# Patient Record
Sex: FEMALE | Race: WHITE | URBAN - METROPOLITAN AREA
[De-identification: names, ages, dates, MRNs, and addresses within clinical notes are randomized per-mention and may not be internally consistent; named-entity substitution may affect disease eponyms.]

---

## 2017-09-13 ENCOUNTER — OFFICE VISIT (OUTPATIENT)
Dept: DERMATOLOGY | Age: 52
End: 2017-09-13

## 2017-09-13 DIAGNOSIS — L82.1 SEBORRHEIC KERATOSES: Primary | ICD-10-CM

## 2017-09-13 DIAGNOSIS — Z86.018 HISTORY OF DYSPLASTIC NEVUS: ICD-10-CM

## 2017-09-13 DIAGNOSIS — D22.9 BENIGN NEVUS: ICD-10-CM

## 2017-09-13 DIAGNOSIS — L82.0 SEBORRHEIC KERATOSES, INFLAMED: ICD-10-CM

## 2017-09-13 PROCEDURE — 17110 DESTRUCTION B9 LES UP TO 14: CPT | Performed by: DERMATOLOGY

## 2017-09-13 PROCEDURE — 99202 OFFICE O/P NEW SF 15 MIN: CPT | Performed by: DERMATOLOGY

## 2017-09-13 RX ORDER — BUSPIRONE HYDROCHLORIDE 10 MG/1
10 TABLET ORAL
COMMUNITY
Start: 2017-02-23

## 2017-09-13 RX ORDER — LISINOPRIL AND HYDROCHLOROTHIAZIDE 12.5; 1 MG/1; MG/1
1 TABLET ORAL
COMMUNITY
Start: 2017-02-23

## 2017-09-13 RX ORDER — CHOLECALCIFEROL (VITAMIN D3) 1250 MCG
CAPSULE ORAL
COMMUNITY
Start: 2017-09-11

## 2019-11-20 ENCOUNTER — OFFICE VISIT (OUTPATIENT)
Dept: DERMATOLOGY | Age: 54
End: 2019-11-20
Payer: COMMERCIAL

## 2019-11-20 DIAGNOSIS — D22.9 BENIGN NEVUS: ICD-10-CM

## 2019-11-20 DIAGNOSIS — L82.0 SEBORRHEIC KERATOSES, INFLAMED: Primary | ICD-10-CM

## 2019-11-20 DIAGNOSIS — L82.1 SEBORRHEIC KERATOSES: ICD-10-CM

## 2019-11-20 DIAGNOSIS — Z86.018 HISTORY OF DYSPLASTIC NEVUS: ICD-10-CM

## 2019-11-20 PROCEDURE — 17110 DESTRUCTION B9 LES UP TO 14: CPT | Performed by: DERMATOLOGY

## 2019-11-20 PROCEDURE — 99213 OFFICE O/P EST LOW 20 MIN: CPT | Performed by: DERMATOLOGY

## 2021-03-15 ENCOUNTER — OFFICE VISIT (OUTPATIENT)
Dept: DERMATOLOGY | Age: 56
End: 2021-03-15
Payer: COMMERCIAL

## 2021-03-15 VITALS — TEMPERATURE: 98.5 F

## 2021-03-15 DIAGNOSIS — L82.0 SEBORRHEIC KERATOSES, INFLAMED: ICD-10-CM

## 2021-03-15 DIAGNOSIS — Z86.018 HISTORY OF DYSPLASTIC NEVUS: ICD-10-CM

## 2021-03-15 DIAGNOSIS — L82.1 SEBORRHEIC KERATOSES: ICD-10-CM

## 2021-03-15 DIAGNOSIS — D22.9 BENIGN NEVUS: ICD-10-CM

## 2021-03-15 DIAGNOSIS — B07.0 PLANTAR WART: Primary | ICD-10-CM

## 2021-03-15 PROCEDURE — 17110 DESTRUCTION B9 LES UP TO 14: CPT | Performed by: DERMATOLOGY

## 2021-03-15 PROCEDURE — 99213 OFFICE O/P EST LOW 20 MIN: CPT | Performed by: DERMATOLOGY

## 2021-08-27 NOTE — PROGRESS NOTES
[facility]) Dermatology  Char Houston M.D.  [phone]       Leticia Hawthorne  1965    54 y.o. female     Date of Visit: 3/15/2021    Chief Complaint:   Chief Complaint   Patient presents with    Skin Exam    Verruca Vulgaris     Plantar wart - R bottom of foot PCP froze a month ago         I was asked to see this patient by Dr. Campbell ref. provider found. History of Present Illness:  1. Total-body skin exam    New problem-viral verruca right plantar foot. Present for almost a year-increased in size. Primary care provider treated with liquid nitrogen about a month ago. Patient not currently treating at home. Irritated seborrheic keratosis left medial breast-became thick and dry-rubbed on her clothing and bled. Has since healed without difficulty, but in a location that it is easily traumatized    Multiple seborrheic keratoses over her torso-slowly increasing in size and number but no other symptomatic lesions    Multiple nevi. Stable in size, shape, color. Asymptomatic. Does monitor her skin for change. Outside hospital records:  10/11 left lateral shoulder dysplastic nevus with moderate dysplasia, completely excised. 4/11 right anterior hip dysplastic nevus, moderate dysplasia with congenital features excised     No personal or family history of skin cancer     Former tanning bed user     Former patient in [address]    Review of Systems:  Constitutional: Reports general sense of well-being       Past Medical History, Surgical History, Family History, Medications and Allergies reviewed.     Social History:   Social History     Socioeconomic History    Marital status:      Spouse name: Not on file    Number of children: Not on file    Years of education: Not on file    Highest education level: Not on file   Occupational History    Not on file   Social Needs    Financial resource strain: Not on file    Food insecurity     Worry: Not on file     Inability: Not on file   [facility] Called patient he stated he got his labs done this morning.    Transportation needs     Medical: Not on file     Non-medical: Not on file   Tobacco Use    Smoking status: Never Smoker    Smokeless tobacco: Never Used   Substance and Sexual Activity    Alcohol use: Yes     Alcohol/week: 2.0 standard drinks     Types: 2 Glasses of wine per week     Comment: monthly    Drug use: No    Sexual activity: Not on file   Lifestyle    Physical activity     Days per week: Not on file     Minutes per session: Not on file    Stress: Not on file   Relationships    Social connections     Talks on phone: Not on file     Gets together: Not on file     Attends Yazidi service: Not on file     Active member of club or organization: Not on file     Attends meetings of clubs or organizations: Not on file     Relationship status: Not on file    Intimate partner violence     Fear of current or ex partner: Not on file     Emotionally abused: Not on file     Physically abused: Not on file     Forced sexual activity: Not on file   Other Topics Concern    Not on file   Social History Narrative    Not on file       Physical Examination       -General: Well-appearing, NAD  1. Normal affect. Total body skin exam including scalp, face, neck, chest, abdomen, back, bilateral upper extremities, bilateral lower extremities, ocular conjunctiva, external lips, and nails was performed. Examination normal unless stated below. Underwear area not examined. Scattered on the trunk and extremities are multiple well-defined round and oval symmetric smoothly-bordered uniformly brown macules and papules. Right plantar foot verrucous plaque  Multiple widespread hyperkeratotic stuck on papules and plaques over her torso including a left medial breast  Well-healed scar at site of prior dysplastic nevi no sign of recurrence      Assessment and Plan     1. Plantar wart -2-right plantar foot lesion(s) treated w/ liquid nitrogen. Edu re: risk of blister formation, discomfort, scar, hypopigmentation.  Discussed wound care. Discussed 71% salicylic acid under occlusion at night and keeping area [id number] with primary care doctor as already planned for ongoing liquid nitrogen     2. Seborrheic keratoses, inflamed -left-1 lesion(s) treated w/ liquid nitrogen. Edu re: risk of blister formation, discomfort, scar, hypopigmentation. Discussed wound care. 3. Seborrheic keratoses - Discussed underlying nature of seborrheic keratosis and low risk of malignancy. Treatment reserved for lesions that are itching, bleeding, growing or otherwise becoming bothersome. Discussed monitoring for change with reevaluation for changing lesions. 4. Benign nevus - Benign acquired melanocytic nevi  -Recommend monthly self skin exams   -Educated regarding the ABCDEs of melanoma detection   -Call for any new/changing moles or concerning lesions  -Reviewed sun protective behavior -- sun avoidance during the peak hours of the day, sun-protective clothing (including hat and sunglasses), sunscreen use (water resistant, broad spectrum, SPF at least 30, need for reapplication every 2 to 3 hours), avoidance of tanning beds      5.  History of dysplastic nevus-monitor for new or changing pigmented lesion

## 2022-02-16 ENCOUNTER — OFFICE VISIT (OUTPATIENT)
Dept: DERMATOLOGY | Age: 57
End: 2022-02-16
Payer: COMMERCIAL

## 2022-02-16 VITALS — TEMPERATURE: 98 F

## 2022-02-16 DIAGNOSIS — L82.0 SEBORRHEIC KERATOSES, INFLAMED: ICD-10-CM

## 2022-02-16 DIAGNOSIS — L82.1 SEBORRHEIC KERATOSES: ICD-10-CM

## 2022-02-16 DIAGNOSIS — D22.9 BENIGN NEVUS: ICD-10-CM

## 2022-02-16 DIAGNOSIS — D48.5 NEOPLASM OF UNCERTAIN BEHAVIOR OF SKIN: Primary | ICD-10-CM

## 2022-02-16 PROCEDURE — 11102 TANGNTL BX SKIN SINGLE LES: CPT | Performed by: DERMATOLOGY

## 2022-02-16 PROCEDURE — 99213 OFFICE O/P EST LOW 20 MIN: CPT | Performed by: DERMATOLOGY

## 2022-02-16 PROCEDURE — 17111 DESTRUCTION B9 LESIONS 15/>: CPT | Performed by: DERMATOLOGY

## 2022-02-16 RX ORDER — CETIRIZINE HYDROCHLORIDE 10 MG/1
10 TABLET ORAL DAILY
COMMUNITY

## 2022-02-16 RX ORDER — SERTRALINE HYDROCHLORIDE 25 MG/1
25 TABLET, FILM COATED ORAL DAILY
COMMUNITY
Start: 2021-11-30

## 2022-02-16 RX ORDER — VIT C/B6/B5/MAGNESIUM/HERB 173 50-5-6-5MG
CAPSULE ORAL DAILY
COMMUNITY

## 2022-02-16 NOTE — PROGRESS NOTES
[facility]) Dermatology  Josue Bullock M.D.  [phone]       Robyn Montemayor  1965    64 y.o. female     Date of Visit: 2/16/2022    Chief Complaint:   Chief Complaint   Patient presents with    Skin Lesion        I was asked to see this patient by Dr. Campbell ref. provider found. History of Present Illness:  1. Total-body skin exam    New scaly pink papule upper lip at [facility]-has bled but usually after trauma. Patient has scratch at lesion. Present for at least months. Widespread seborrheic keratoses torso, scalp-increasing in size and number. Pruritic. Multiple other relatively flat seborrheic keratoses that are asymptomatic-not itching or bleeding. Multiple nevi-stable in size, shape, color. Has not noticed any new or changing pigmented lesions. Does monitor her skin for change. Outside hospital records:  10/11 left lateral shoulder dysplastic nevus with moderate dysplasia, completely excised. 4/11 right anterior hip dysplastic nevus, moderate dysplasia with congenital features excised     No personal or family history of skin cancer     Former tanning bed user     Former patient in [address] [place] [address]    Review of Systems:  Constitutional: Reports general sense of well-being       Past Medical History, Surgical History, Family History, Medications and Allergies reviewed. Social History:   Social History     Socioeconomic History    Marital status:      Spouse name: Not on file    Number of children: Not on file    Years of education: Not on file    Highest education level: Not on file   Occupational History    Not on file   Tobacco Use    Smoking status: Never Smoker    Smokeless tobacco: Never Used   Vaping Use    Vaping Use: Never used   Substance and Sexual Activity    Alcohol use:  Yes     Alcohol/week: 2.0 standard drinks     Types: 2 Glasses of wine per week     Comment: monthly    Drug use: No    Sexual activity: Not on file   Other Topics Concern    Not on file   Social History Narrative    Not on file     Social Determinants of Health     Financial Resource Strain:     Difficulty of Paying Living Expenses: Not on file   Food Insecurity:     Worried About Running Out of Food in the Last Year: Not on file    Aditya of Food in the Last Year: Not on file   Transportation Needs:     Lack of Transportation (Medical): Not on file    Lack of Transportation (Non-Medical): Not on file   Physical Activity:     Days of Exercise per Week: Not on file    Minutes of Exercise per Session: Not on file   Stress:     Feeling of Stress : Not on file   Social Connections:     Frequency of Communication with Friends and Family: Not on file    Frequency of Social Gatherings with Friends and Family: Not on file    Attends Pentecostalism Services: Not on file    Active Member of [address] [place] [address] [employer] or Organizations: Not on file    Attends Club or Organization Meetings: Not on file    Marital Status: Not on file   Intimate Partner Violence:     Fear of Current or Ex-Partner: Not on file    Emotionally Abused: Not on file    Physically Abused: Not on file    Sexually Abused: Not on file   Housing Stability:     Unable to Pay for Housing in the Last Year: Not on file    Number of [place] in the Last Year: Not on file    Unstable Housing in the Last Year: Not on file       Physical Examination       -General: Well-appearing, NAD  1. Normal affect. Total body skin exam including scalp, face, neck, chest, abdomen, back, bilateral upper extremities, bilateral lower extremities, ocular conjunctiva, external lips, and nails was performed. Examination normal unless stated below. Underwear area not examined. 'Scattered on the trunk and extremities are multiple well-defined round and oval symmetric smoothly-bordered uniformly brown macules and papules.   Multiple hyperkeratotic stuck on papules and plaques torso, scalp  Visibly traumatized seborrheic keratosis left scalp, also over her torso

## 2022-02-18 LAB — DERMATOLOGY PATHOLOGY REPORT: NORMAL

## 2022-04-21 ENCOUNTER — OFFICE VISIT (OUTPATIENT)
Dept: DERMATOLOGY | Age: 57
End: 2022-04-21
Payer: COMMERCIAL

## 2022-04-21 DIAGNOSIS — L82.0 SEBORRHEIC KERATOSES, INFLAMED: Primary | ICD-10-CM

## 2022-04-21 DIAGNOSIS — B07.9 VERRUCA: ICD-10-CM

## 2022-04-21 PROCEDURE — 17111 DESTRUCTION B9 LESIONS 15/>: CPT | Performed by: DERMATOLOGY

## 2022-04-21 NOTE — PROGRESS NOTES
[facility]) Dermatology  Adeel Marrufo M.D.  [phone]       Carol Salvaodr  1965    64 y.o. female     Date of Visit: 4/21/2022    Chief Complaint:   Chief Complaint   Patient presents with    Skin Lesion     LN2        I was asked to see this patient by Dr. Campbell ref. provider found. History of Present Illness:  1. Patient presents today for follow-up of seborrheic keratoses-did have good improvement after treatment with liquid nitrogen on her lower back, still has multiple seborrheic keratoses inframammary chest, lateral breast, scalp that are pruritic, raised, increasing in number. Plantar wart right plantar surface of foot-treated previously with liquid nitrogen by myself, treated again by her primary care doctor about 2 weeks ago. Plans to see a podiatrist.  Has tried over-the-counter freeze off and salicylic acid. Outside hospital records:  10/11 left lateral shoulder dysplastic nevus with moderate dysplasia, completely excised. 4/11 right anterior hip dysplastic nevus, moderate dysplasia with congenital features excise3/d  2/22 mid upper lip at [facility] compound nevus     No personal or family history of skin cancer     Former tanning bed user     Former patient in [address] [place] [address]    Review of Systems:  Constitutional: Reports general sense of well-being       Past Medical History, Surgical History, Family History, Medications and Allergies reviewed. Social History:   Social History     Socioeconomic History    Marital status:      Spouse name: Not on file    Number of children: Not on file    Years of education: Not on file    Highest education level: Not on file   Occupational History    Not on file   Tobacco Use    Smoking status: Never Smoker    Smokeless tobacco: Never Used   Vaping Use    Vaping Use: Never used   Substance and Sexual Activity    Alcohol use:  Yes     Alcohol/week: 2.0 standard drinks     Types: 2 Glasses of wine per week     Comment: monthly    Drug use: No    Sexual activity: Not on file   Other Topics Concern    Not on file   Social History Narrative    Not on file     Social Determinants of Health     Financial Resource Strain:     Difficulty of Paying Living Expenses: Not on file   Food Insecurity:     Worried About Running Out of Food in the Last Year: Not on file    Aditya of Food in the Last Year: Not on file   Transportation Needs:     Lack of Transportation (Medical): Not on file    Lack of Transportation (Non-Medical): Not on file   Physical Activity:     Days of Exercise per Week: Not on file    Minutes of Exercise per Session: Not on file   Stress:     Feeling of Stress : Not on file   Social Connections:     Frequency of Communication with Friends and Family: Not on file    Frequency of Social Gatherings with Friends and Family: Not on file    Attends Orthodoxy Services: Not on file    Active Member of [address] [place] [address] [employer] or Organizations: Not on file    Attends Club or Organization Meetings: Not on file    Marital Status: Not on file   Intimate Partner Violence:     Fear of Current or Ex-Partner: Not on file    Emotionally Abused: Not on file    Physically Abused: Not on file    Sexually Abused: Not on file   Housing Stability:     Unable to Pay for Housing in the Last Year: Not on file    Number of [place] in the Last Year: Not on file    Unstable Housing in the Last Year: Not on file       Physical Examination       -General: Well-appearing, NAD  1. Multiple hyperkeratotic stuck on papules lateral breast, inframammary chest, scalp  Verrucous papule right plantar foot      Assessment and Plan     1. Seborrheic keratoses, inflamed -19-torso, scalp lesion(s) treated w/ liquid nitrogen. Edu re: risk of blister formation, discomfort, scar, hypopigmentation. Discussed wound care. 2. Verruca -1-right plantar foot lesion(s) treated w/ liquid nitrogen x3. Edu re: risk of blister formation, discomfort, scar, hypopigmentation. Discussed wound care.    Salicylic acid 58% under [id number] with podiatry

## 2023-03-13 ENCOUNTER — OFFICE VISIT (OUTPATIENT)
Dept: DERMATOLOGY | Age: 58
End: 2023-03-13
Payer: COMMERCIAL

## 2023-03-13 DIAGNOSIS — L82.1 SEBORRHEIC KERATOSES: ICD-10-CM

## 2023-03-13 DIAGNOSIS — L82.0 SEBORRHEIC KERATOSES, INFLAMED: ICD-10-CM

## 2023-03-13 DIAGNOSIS — Z86.018 HISTORY OF DYSPLASTIC NEVUS: ICD-10-CM

## 2023-03-13 DIAGNOSIS — D48.5 NEOPLASM OF UNCERTAIN BEHAVIOR OF SKIN: Primary | ICD-10-CM

## 2023-03-13 DIAGNOSIS — D22.9 BENIGN NEVUS: ICD-10-CM

## 2023-03-13 PROCEDURE — 17110 DESTRUCTION B9 LES UP TO 14: CPT | Performed by: DERMATOLOGY

## 2023-03-13 PROCEDURE — 11102 TANGNTL BX SKIN SINGLE LES: CPT | Performed by: DERMATOLOGY

## 2023-03-13 PROCEDURE — 99213 OFFICE O/P EST LOW 20 MIN: CPT | Performed by: DERMATOLOGY

## 2023-03-13 NOTE — PROGRESS NOTES
[facility]) Dermatology  Read LISA Pelaez  [phone]       Frankey Pai  1965    62 y.o. female     Date of Visit: 3/13/2023    Chief Complaint:   Chief Complaint   Patient presents with    Skin Lesion        I was asked to see this patient by Dr. Campbell ref. provider found. History of Present Illness:  1. Total-body skin exam    Scattered on the trunk and extremities are multiple well-defined round and oval symmetric smoothly-bordered uniformly brown macules and papules. Multiple irritated seborrheic keratoses-left lower abdomen, lateral torso bilaterally, right upper eyelid-increasing in size, becoming pruritic. Multiple other seborrheic keratoses torso that are asymptomatic. Not itching, bleeding. Outside hospital records:  10/11 left lateral shoulder dysplastic nevus with moderate dysplasia, completely excised. 4/11 right anterior hip dysplastic nevus, moderate dysplasia with congenital features excise3/d  2/22 mid upper lip at [facility] compound nevus     No personal or family history of skin cancer     Former tanning bed user     Former patient in [address]:  Constitutional: Reports general sense of well-being       Past Medical History, Surgical History, Family History, Medications and Allergies reviewed. Social History:   Social History     Socioeconomic History    Marital status:      Spouse name: Not on file    Number of children: Not on file    Years of education: Not on file    Highest education level: Not on file   Occupational History    Not on file   Tobacco Use    Smoking status: Never    Smokeless tobacco: Never   Vaping Use    Vaping Use: Never used   Substance and Sexual Activity    Alcohol use:  Yes     Alcohol/week: 2.0 standard drinks     Types: 2 Glasses of wine per week     Comment: monthly    Drug use: No    Sexual activity: Not on file   Other Topics Concern    Not on file   Social History Narrative    Not on file     Social Determinants of Health     Financial Resource Strain: Not on file   Food Insecurity: Not on file   Transportation Needs: Not on file   Physical Activity: Not on file   Stress: Not on file   Social Connections: Not on file   Intimate Partner Violence: Not on file   Housing Stability: Not on file       Physical Examination       -General: Well-appearing, NAD  1. Normal affect. Total body skin exam including scalp, face, neck, chest, abdomen, back, bilateral upper extremities, bilateral lower extremities, ocular conjunctiva, external lips, and nails was performed. Examination normal unless stated below. Underwear area not examined. Scattered on the trunk and extremities are multiple well-defined round and oval symmetric smoothly-bordered uniformly brown macules and papules. Scattered hyperkeratotic stuck on papules and plaques over her torso and right upper eyelid    Right lateral axilla 4 mm brown papule with darker margin rule out atypia          Assessment and Plan     1. Neoplasm of uncertain behavior of skin-right lateral axilla--Discussed possible diagnosis. Patient agreeable to biopsy. Verbal consent obtained after risks (infection, bleeding, scar), benefits and alternatives explained. -Area(s) to be biopsied were marked with a surgical pen. Site(s) were cleansed with alcohol. Local anesthesia achieved with 1% lidocaine with epinephrine/sodium bicarbonate. Shave biopsy performed with a razor blade. Hemostasis was achieved with aluminum chloride. The wound(s) were dressed with petrolatum and covered with a bandage. Specimen(s) sent to pathology. Pt educated re: risk of bleeding, infection, scar and wound care instructions. 2. Seborrheic keratoses, inflamed -6-torso, right upper eyelid, after the risks, complications, and alternatives were explained to the patient, including risk of blister formation, discomfort, scar, hypopigmentation, patient elected to proceed with cryotherapy.  Lesion(s) were treated w/ liquid nitrogen using a Cryogun. 1 freeze thaw cycle was performed with a freeze time of 1-5 seconds. There were no immediate complications. Wound care instructions were discussed with the patient. 3. Seborrheic keratoses - Discussed underlying nature of seborrheic keratosis and low risk of malignancy. Treatment reserved for lesions that are itching, bleeding, growing or otherwise becoming bothersome. Discussed monitoring for change with reevaluation for changing lesions. 4. Benign nevus - Benign acquired melanocytic nevi  -Recommend monthly self skin exams   -Educated regarding the ABCDEs of melanoma detection   -Call for any new/changing moles or concerning lesions  -Reviewed sun protective behavior -- sun avoidance during the peak hours of the day, sun-protective clothing (including hat and sunglasses), sunscreen use (water resistant, broad spectrum, SPF at least 30, need for reapplication every 2 to 3 hours), avoidance of tanning beds      5.  History of dysplastic nevus-monitor for new or changing pigmented lesions

## 2023-03-16 LAB — DERMATOLOGY PATHOLOGY REPORT: NORMAL

## 2024-05-01 ENCOUNTER — OFFICE VISIT (OUTPATIENT)
Dept: DERMATOLOGY | Age: 59
End: 2024-05-01
Payer: COMMERCIAL

## 2024-05-01 DIAGNOSIS — L56.4 POLYMORPHIC LIGHT ERUPTION: Primary | ICD-10-CM

## 2024-05-01 DIAGNOSIS — D22.9 BENIGN NEVUS: ICD-10-CM

## 2024-05-01 DIAGNOSIS — L82.0 SEBORRHEIC KERATOSES, INFLAMED: ICD-10-CM

## 2024-05-01 DIAGNOSIS — Z86.018 HISTORY OF DYSPLASTIC NEVUS: ICD-10-CM

## 2024-05-01 DIAGNOSIS — L82.1 SEBORRHEIC KERATOSES: ICD-10-CM

## 2024-05-01 PROCEDURE — 17111 DESTRUCTION B9 LESIONS 15/>: CPT | Performed by: DERMATOLOGY

## 2024-05-01 PROCEDURE — 99214 OFFICE O/P EST MOD 30 MIN: CPT | Performed by: DERMATOLOGY

## 2024-05-01 NOTE — PROGRESS NOTES
[facility] Dermatology  Wanda Reyez M.D.  [phone]       Orly Guaman  1965    58 y.o. female     Date of Visit: 5/1/2024    Chief Complaint: No chief complaint on file.       I was asked to see this patient by Dr. Campbell ref. provider found.    History of Present Illness:  1. TBSE    Multiple nevi. Patient has not noticed any new or changing pigmented lesions.   Stable in size, shape, color. Not itching, bleeding.     Patient had an episode of rash in February-has happened 1 other time prior to February when on vacation.  Only happens either when on vacation in the winter or early summer-erythema and edema mostly of lower legs.  Extremely pruritic.  Develops despite efforts to avoid sun.  Did not resolve until she returned from vacation.  Would like to go on vacation again next winter and inquiring about treatment options.  Is being evaluated for an autoimmune process through her rheumatologist.  Has no clear diagnosis.  Was told she did not have lupus.  Rash did not involve other sun exposed areas such as face, ears, chest, shoulders.  No hair loss.    Multiple seborrheic keratoses torso.  Increasing in size and number.  Particularly bothersome around her bra strap and waist.     Outside hospital records:  10/11 left lateral shoulder dysplastic nevus with moderate dysplasia, completely excised.  4/11 right anterior hip dysplastic nevus, moderate dysplasia with congenital features excise3/d  2/22 mid upper lip at [facility] compound nevus     No personal or family history of skin cancer     Former tanning bed user     Former patient in [facility]       Review of Systems:  Constitutional: Reports general sense of well-being       Past Medical History, Surgical History, Family History, Medications and Allergies reviewed.    Social History:   Social History     Socioeconomic History    Marital status:      Spouse name: Not on file    Number of children: Not on file    Years of education: Not on

## 2024-11-19 ENCOUNTER — OFFICE VISIT (OUTPATIENT)
Dept: DERMATOLOGY | Age: 59
End: 2024-11-19
Payer: COMMERCIAL

## 2024-11-19 DIAGNOSIS — L82.0 SEBORRHEIC KERATOSES, INFLAMED: Primary | ICD-10-CM

## 2024-11-19 DIAGNOSIS — Z86.018 HISTORY OF DYSPLASTIC NEVUS: ICD-10-CM

## 2024-11-19 DIAGNOSIS — L56.4 POLYMORPHIC LIGHT ERUPTION: ICD-10-CM

## 2024-11-19 PROCEDURE — 99213 OFFICE O/P EST LOW 20 MIN: CPT | Performed by: DERMATOLOGY

## 2024-11-19 PROCEDURE — 17111 DESTRUCTION B9 LESIONS 15/>: CPT | Performed by: DERMATOLOGY

## 2024-11-19 RX ORDER — PREDNISONE 10 MG/1
TABLET ORAL
Qty: 24 TABLET | Refills: 0 | Status: SHIPPED | OUTPATIENT
Start: 2024-11-19

## 2024-11-19 RX ORDER — CLOBETASOL PROPIONATE 0.5 MG/G
CREAM TOPICAL
Qty: 60 G | Refills: 2 | Status: SHIPPED | OUTPATIENT
Start: 2024-11-19

## 2024-11-19 NOTE — PROGRESS NOTES
[facility] Dermatology  Wanda Reyez M.D.  [phone]       Orly Guaman  1965    58 y.o. female     Date of Visit: 11/19/2024    Chief Complaint:   Chief Complaint   Patient presents with    Skin Lesion        I was asked to see this patient by Dr. Campbell ref. provider found.    History of Present Illness:  1.  Patient presents today for follow-up    Increasing number of seborrheic keratoses along her abdomen, lateral torso, back, hips, breast, right posterior thigh.  Good improvement after treatment with liquid nitrogen at her last visit    History suspicious for polymorphous light eruption although also being evaluated for autoimmune conditions.  We had discussed prednisone and topical prescription that she can take on vacation with her-last few flares have really limited her enjoyment of her vacation    Verruca plantar surface right foot-working with podiatry      Outside hospital records:  10/11 left lateral shoulder dysplastic nevus with moderate dysplasia, completely excised.  4/11 right anterior hip dysplastic nevus, moderate dysplasia with congenital features excise3/d  2/22 mid upper lip at [facility] compound nevus     No personal or family history of skin cancer     Former tanning bed user     Former patient in [facility]    Review of Systems:  Constitutional: Reports general sense of well-being       Past Medical History, Surgical History, Family History, Medications and Allergies reviewed.    Social History:   Social History     Socioeconomic History    Marital status:      Spouse name: Not on file    Number of children: Not on file    Years of education: Not on file    Highest education level: Not on file   Occupational History    Not on file   Tobacco Use    Smoking status: Never    Smokeless tobacco: Never   Vaping Use    Vaping status: Never Used   Substance and Sexual Activity    Alcohol use: Yes     Alcohol/week: 2.0 standard drinks of alcohol     Types: 2 Glasses of wine per